# Patient Record
Sex: MALE | Race: ASIAN | NOT HISPANIC OR LATINO | URBAN - METROPOLITAN AREA
[De-identification: names, ages, dates, MRNs, and addresses within clinical notes are randomized per-mention and may not be internally consistent; named-entity substitution may affect disease eponyms.]

---

## 2023-11-22 ENCOUNTER — HOSPITAL ENCOUNTER (EMERGENCY)
Facility: HOSPITAL | Age: 33
Discharge: HOME | End: 2023-11-22
Attending: STUDENT IN AN ORGANIZED HEALTH CARE EDUCATION/TRAINING PROGRAM | Admitting: STUDENT IN AN ORGANIZED HEALTH CARE EDUCATION/TRAINING PROGRAM
Payer: COMMERCIAL

## 2023-11-22 ENCOUNTER — APPOINTMENT (OUTPATIENT)
Dept: GENERAL RADIOLOGY | Facility: HOSPITAL | Age: 33
End: 2023-11-22
Payer: COMMERCIAL

## 2023-11-22 VITALS
HEIGHT: 64 IN | OXYGEN SATURATION: 100 % | TEMPERATURE: 98.1 F | RESPIRATION RATE: 20 BRPM | HEART RATE: 89 BPM | BODY MASS INDEX: 22.53 KG/M2 | WEIGHT: 132 LBS | DIASTOLIC BLOOD PRESSURE: 134 MMHG | SYSTOLIC BLOOD PRESSURE: 188 MMHG

## 2023-11-22 DIAGNOSIS — S61.219A: Primary | ICD-10-CM

## 2023-11-22 DIAGNOSIS — S61.411A: Primary | ICD-10-CM

## 2023-11-22 PROCEDURE — 96372 THER/PROPH/DIAG INJ SC/IM: CPT

## 2023-11-22 PROCEDURE — 73130 X-RAY EXAM OF HAND: CPT

## 2023-11-22 PROCEDURE — 25010000002 TETANUS-DIPHTH-ACELL PERTUSSIS 5-2.5-18.5 LF-MCG/0.5 SUSPENSION PREFILLED SYRINGE: Performed by: NURSE PRACTITIONER

## 2023-11-22 PROCEDURE — 99283 EMERGENCY DEPT VISIT LOW MDM: CPT

## 2023-11-22 PROCEDURE — 25010000002 CEFAZOLIN PER 500 MG: Performed by: NURSE PRACTITIONER

## 2023-11-22 PROCEDURE — 63710000001 ONDANSETRON ODT 4 MG TABLET DISPERSIBLE: Performed by: NURSE PRACTITIONER

## 2023-11-22 PROCEDURE — 90715 TDAP VACCINE 7 YRS/> IM: CPT | Performed by: NURSE PRACTITIONER

## 2023-11-22 PROCEDURE — 90471 IMMUNIZATION ADMIN: CPT | Performed by: NURSE PRACTITIONER

## 2023-11-22 RX ORDER — ONDANSETRON 4 MG/1
4 TABLET, ORALLY DISINTEGRATING ORAL ONCE
Status: COMPLETED | OUTPATIENT
Start: 2023-11-22 | End: 2023-11-22

## 2023-11-22 RX ORDER — ONDANSETRON 4 MG/1
4 TABLET, ORALLY DISINTEGRATING ORAL EVERY 8 HOURS PRN
Qty: 30 TABLET | Refills: 0 | Status: SHIPPED | OUTPATIENT
Start: 2023-11-22

## 2023-11-22 RX ORDER — CEPHALEXIN 500 MG/1
500 CAPSULE ORAL 3 TIMES DAILY
Qty: 30 CAPSULE | Refills: 0 | Status: SHIPPED | OUTPATIENT
Start: 2023-11-22

## 2023-11-22 RX ORDER — CEFAZOLIN SODIUM 1 G/3ML
1 INJECTION, POWDER, FOR SOLUTION INTRAMUSCULAR; INTRAVENOUS ONCE
Status: COMPLETED | OUTPATIENT
Start: 2023-11-22 | End: 2023-11-22

## 2023-11-22 RX ORDER — HYDROCODONE BITARTRATE AND ACETAMINOPHEN 5; 325 MG/1; MG/1
1 TABLET ORAL EVERY 6 HOURS PRN
Qty: 12 TABLET | Refills: 0 | Status: SHIPPED | OUTPATIENT
Start: 2023-11-22

## 2023-11-22 RX ORDER — HYDROCODONE BITARTRATE AND ACETAMINOPHEN 5; 325 MG/1; MG/1
1 TABLET ORAL ONCE AS NEEDED
Status: COMPLETED | OUTPATIENT
Start: 2023-11-22 | End: 2023-11-22

## 2023-11-22 RX ADMIN — HYDROCODONE BITARTRATE AND ACETAMINOPHEN 1 TABLET: 5; 325 TABLET ORAL at 07:03

## 2023-11-22 RX ADMIN — CEFAZOLIN 1 G: 1 INJECTION, POWDER, FOR SOLUTION INTRAMUSCULAR; INTRAVENOUS at 09:31

## 2023-11-22 RX ADMIN — TETANUS TOXOID, REDUCED DIPHTHERIA TOXOID AND ACELLULAR PERTUSSIS VACCINE, ADSORBED 0.5 ML: 5; 2.5; 8; 8; 2.5 SUSPENSION INTRAMUSCULAR at 07:04

## 2023-11-22 RX ADMIN — ONDANSETRON 4 MG: 4 TABLET, ORALLY DISINTEGRATING ORAL at 07:03

## 2023-11-22 NOTE — ED PROVIDER NOTES
EMERGENCY DEPARTMENT ENCOUNTER    Room Number:  02/02  PCP: Provider, No Known  Historian: patient      HPI:  Chief Complaint: Right hand lacerations  A complete HPI/ROS/PMH/PSH/SH/FH are unobtainable due to: Language barrier, patient speaks [place],  # [id number]  Context: Nancy Jefferson is a pleasant afebrile ambulatory 33 y.o. male who presents to the ED c/o injury to his right hand.    Patient primarily speaks [place].  He is right-hand dominant.  States he was using a machine this morning that he seems to describe a jessica when he sustained lacerations to his right first second and third digits.  He denies any numbness or tingling.  He has normal range of motion.  Unsure of his last tetanus immunization            PAST MEDICAL HISTORY  Active Ambulatory Problems     Diagnosis Date Noted    No Active Ambulatory Problems     Resolved Ambulatory Problems     Diagnosis Date Noted    No Resolved Ambulatory Problems     No Additional Past Medical History         PAST SURGICAL HISTORY  No past surgical history on file.      FAMILY HISTORY  No family history on file.      SOCIAL HISTORY  Social History     Socioeconomic History    Marital status: Unknown         ALLERGIES  Patient has no known allergies.        REVIEW OF SYSTEMS  Review of Systems   Skin:  Positive for wound.   All other systems reviewed and are negative.           PHYSICAL EXAM  ED Triage Vitals [11/22/23 0637]   Temp Heart Rate Resp BP SpO2   98.1 °F (36.7 °C) 89 20 (!) 188/134 100 %      Temp src Heart Rate Source Patient Position BP Location FiO2 (%)   Oral Monitor Sitting Left arm --       Physical Exam      GENERAL: Alert and oriented x 4, no acute distress  HENT: nares patent, mucous membranes are moist and intact  EYES: no scleral icterus, no injection  CV: regular rhythm, normal rate, no murmur or peripheral edema  RESPIRATORY: normal effort, clear to auscultation all fields bilaterally  ABDOMEN: soft and nontender diffusely, bowel sounds  WNL  MUSCULOSKELETAL: Laceration to right first second and third digits with palmar laceration flexion present to thumb with moderate soft tissue swelling, cap refill is brisk, normal sensation  NEURO: alert, moves all extremities, follows commands  PSYCH:  calm, cooperative  SKIN: warm, dry    Vital signs and nursing notes reviewed.          LAB RESULTS  No results found for this or any previous visit (from the past 24 hour(s)).    Ordered the above labs and reviewed the results.        RADIOLOGY  XR Hand 3+ View Right    Result Date: 11/22/2023  XR HAND 3+ VW RIGHT-  Clinical: Trauma, laceration 1 through third digits, limited mobility.  FINDINGS: Soft tissue gas along the area of laceration of the thumb, index and long fingers. No acute osseous or articular abnormality of the thumb or long finger. There is several punctate densities demonstrated within the soft tissues adjacent to the proximal phalanx of the index finger. These worrisome for bone fragments. The middle and distal phalanx of the index finger have a satisfactory appearance.  No abnormality of the ring or small fingers nor the metacarpals. The remainder is unremarkable.  This report was finalized on 11/22/2023 7:08 AM by Dr. Axel Trent M.D on Workstation: [employer]       Ordered the above noted radiological studies. Reviewed by me in PACS.            PROCEDURES  Laceration Repair    Date/Time: 11/22/2023 8:00 AM    Performed by: Liz Beauchamp APRN  Authorized by: Stepan Arana MD    Consent:     Consent obtained:  Verbal    Consent given by:  Patient    Risks, benefits, and alternatives were discussed: yes      Risks discussed:  Infection    Alternatives discussed:  No treatment  Universal protocol:     Patient identity confirmed:  Verbally with patient  Anesthesia:     Anesthesia method:  Local infiltration    Local anesthetic:  Lidocaine 1% WITH epi  Laceration details:     Location:  Hand    Hand location:  R palm (r 2nd digit)    Length  (cm):  4  Pre-procedure details:     Preparation:  Patient was prepped and draped in usual sterile fashion and imaging obtained to evaluate for foreign bodies  Exploration:     Hemostasis achieved with:  Epinephrine and direct pressure    Wound exploration: wound explored through full range of motion      Contaminated: yes    Treatment:     Area cleansed with:  Povidone-iodine and saline    Amount of cleaning:  Extensive    Irrigation solution:  Sterile saline    Irrigation method:  Syringe    Visualized foreign bodies/material removed: yes      Debridement:  Minimal  Skin repair:     Repair method:  Sutures    Suture size:  5-0    Suture technique:  Simple interrupted    Number of sutures:  11  Approximation:     Approximation:  Close  Repair type:     Repair type:  Intermediate  Post-procedure details:     Dressing:  Sterile dressing and tube gauze    Procedure completion:  Tolerated well, no immediate complications  Laceration Repair    Date/Time: 11/22/2023 9:51 AM    Performed by: Liz Beauchamp APRN  Authorized by: Stepan Arana MD    Consent:     Consent obtained:  Verbal    Consent given by:  Patient    Risks discussed:  Infection    Alternatives discussed:  No treatment  Universal protocol:     Patient identity confirmed:  Verbally with patient  Anesthesia:     Anesthesia method:  Local infiltration    Local anesthetic:  Lidocaine 1% WITH epi  Laceration details:     Location:  Finger    Finger location:  R long finger    Length (cm):  5  Pre-procedure details:     Preparation:  Patient was prepped and draped in usual sterile fashion and imaging obtained to evaluate for foreign bodies  Exploration:     Limited defect created (wound extended): yes      Hemostasis achieved with:  Direct pressure and epinephrine    Imaging obtained: x-ray      Contaminated: no    Treatment:     Area cleansed with:  Povidone-iodine    Amount of cleaning:  Extensive    Irrigation solution:  Sterile saline    Irrigation  method:  Pressure wash  Skin repair:     Repair method:  Sutures    Suture size:  5-0    Suture material:  Nylon    Suture technique:  Simple interrupted    Number of sutures:  14  Approximation:     Approximation:  Close  Repair type:     Repair type:  Intermediate  Post-procedure details:     Dressing:  Tube gauze    Procedure completion:  Tolerated well, no immediate complications  Laceration Repair    Date/Time: 11/22/2023 10:05 AM    Performed by: Liz Beauchamp APRN  Authorized by: Stepan Arana MD    Consent:     Consent obtained:  Verbal    Consent given by:  Patient    Alternatives discussed:  No treatment  Universal protocol:     Patient identity confirmed:  Verbally with patient  Anesthesia:     Anesthesia method:  Local infiltration    Local anesthetic:  Lidocaine 1% WITH epi  Laceration details:     Location:  Finger    Finger location:  R thumb    Length (cm):  3  Pre-procedure details:     Preparation:  Patient was prepped and draped in usual sterile fashion and imaging obtained to evaluate for foreign bodies  Exploration:     Hemostasis achieved with:  Epinephrine and direct pressure    Wound exploration: wound explored through full range of motion    Treatment:     Area cleansed with:  Povidone-iodine    Amount of cleaning:  Extensive    Irrigation solution:  Sterile saline    Irrigation method:  Pressure wash  Skin repair:     Repair method:  Sutures    Suture technique:  Simple interrupted    Number of sutures:  8  Approximation:     Approximation:  Close  Repair type:     Repair type:  Simple  Post-procedure details:     Dressing:  Tube gauze    Procedure completion:  Tolerated well, no immediate complications  Laceration Repair    Date/Time: 11/22/2023 10:07 AM    Performed by: Liz Beauchamp APRN  Authorized by: Stepan Arana MD    Consent:     Consent obtained:  Verbal    Consent given by:  Patient    Alternatives discussed:  No treatment  Anesthesia:     Anesthesia method:  Local  infiltration    Local anesthetic:  Lidocaine 1% WITH epi  Laceration details:     Location:  Hand    Hand location:  R palm    Length (cm):  2  Pre-procedure details:     Preparation:  Patient was prepped and draped in usual sterile fashion  Exploration:     Limited defect created (wound extended): yes      Hemostasis achieved with:  Epinephrine and direct pressure    Wound exploration: wound explored through full range of motion      Contaminated: no    Treatment:     Area cleansed with:  Povidone-iodine    Amount of cleaning:  Standard    Irrigation solution:  Sterile saline    Irrigation method:  Syringe    Visualized foreign bodies/material removed: no      Debridement:  None    Undermining:  None  Skin repair:     Repair method:  Sutures    Suture size:  5-0    Suture material:  Nylon    Suture technique:  Simple interrupted    Number of sutures:  5  Approximation:     Approximation:  Close  Repair type:     Repair type:  Simple  Post-procedure details:     Dressing:  Tube gauze    Procedure completion:  Tolerated well, no immediate complications              MEDICATIONS GIVEN IN ER  Medications   Tetanus-Diphth-Acell Pertussis (BOOSTRIX) injection 0.5 mL (0.5 mL Intramuscular Given 11/22/23 0704)   HYDROcodone-acetaminophen (NORCO) 5-325 MG per tablet 1 tablet (1 tablet Oral Given 11/22/23 0703)   ondansetron ODT (ZOFRAN-ODT) disintegrating tablet 4 mg (4 mg Oral Given 11/22/23 0703)                   MEDICAL DECISION MAKING, PROGRESS, and CONSULTS    All labs have been independently reviewed by me.  All radiology studies have been reviewed by me and I have also reviewed the radiology report.   EKG's independently viewed and interpreted by me.  Discussion below represents my analysis of pertinent findings related to patient's condition, differential diagnosis, treatment plan and final disposition.      Additional sources:  - Discussed/ obtained information from independent historians:  hx obtained from pt  "using the  ipad    - External (non-ED) record review: 10/17/23 nephrology office visit with [facility] physicians for chronic kidney disease, hypertension and nephrotic syndrome.  Patient has a history of IgA nephropathy  Office note History of Present Illness:   \"32 year old [place] male with Nephrotic Syndrome. 10/15/21 Biopsy showed IgA Nephropathy with Mesangial Proliferation [id number]. Patient was started on Prednisone by Dr. Hutton in November and referred to [facility]. Patient actually started taking Prednisone on January 17, 2022. He tapered off prednisone completely in August 2022. \"    - Chronic or social conditions impacting care:     - Shared decision making:  pt agreeable to bedside mgmt of lacerations      Orders placed during this visit:  Orders Placed This Encounter   Procedures    XR Hand 3+ View Right         Additional orders considered but not ordered:    I considered a CT of patient's hand but I do not think would add much to the clinical picture      Differential diagnosis includes but is not limited to:    Fracture, dislocation, soft tissue injury      Independent interpretation of labs, radiology studies, and discussions with consultants:  ED Course as of 11/22/23 0915   Wed Nov 22, 2023   0756 Phone call with dr. Paulino.  Discussed the patient, relevant history, exam, diagnostics, ED findings/progress, and concerns. They have reviewed images of injury and agrees that the ED can perform primary closure and she will follow patient in the office on monday []      ED Course User Index  [] Liz Beauchamp APRN             I have worn appropriate PPE during this patient encounter, sanitized my hands both with entering and exiting patient's room.      DIAGNOSIS  Final diagnoses:   Laceration of multiple sites of hand and fingers without complication, right, initial encounter         DISPOSITION  home            Latest Documented Vital Signs:  As of 07:22 EST  BP- (!) 188/134 HR- 89 " Temp- 98.1 °F (36.7 °C) (Oral) O2 sat- 100%              --    Please note that portions of this were completed with a voice recognition program.       Note Disclaimer: At [facility], we believe that sharing information builds trust and better relationships. You are receiving this note because you are receiving care at [facility] or recently visited. It is possible you will see health information before a provider has talked with you about it. This kind of information can be easy to misunderstand. To help you fully understand what it means for your health, we urge you to discuss this note with your provider.             Liz Beauchamp, APRN  11/22/23 7768

## 2023-11-22 NOTE — ED PROVIDER NOTES
MD ATTESTATION NOTE    The JENNIFER and I have discussed this patient's history, physical exam, and treatment plan.  I have reviewed the documentation and personally had a face to face interaction with the patient. I affirm the documentation and agree with the treatment and plan.  The attached note describes my personal findings.      I provided a substantive portion of the care of the patient.  I personally performed the physical exam in its entirety, and below are my findings.        Brief HPI: 33-year-old male presenting for evaluation of multiple hand lacerations.  Patient was at work when a he got his hand caught in a machine and sustained several lacerations to his right hand.    PHYSICAL EXAM  ED Triage Vitals [11/22/23 0637]   Temp Heart Rate Resp BP SpO2   98.1 °F (36.7 °C) 89 20 (!) 188/134 100 %      Temp src Heart Rate Source Patient Position BP Location FiO2 (%)   Oral Monitor Sitting Left arm --         GENERAL: no acute distress  HENT: nares patent  EYES: no scleral icterus  CV: regular rhythm, normal rate  RESPIRATORY: normal effort  ABDOMEN: soft  MUSCULOSKELETAL: no deformity.  Lacerations to the right first/second/third digits with a large palmar laceration across the thumb.  Appropriate flexion and extension present in all fingers  NEURO: alert, moves all extremities, follows commands  PSYCH:  calm, cooperative  SKIN: warm, dry    Vital signs and nursing notes reviewed.        Plan: 33-year-old male presenting for evaluation of laceration.  Patient noted to have extensive lacerations across the right palm and multiple fingers.  Lacerations repaired by AIDE Perdomo.  X-rays demonstrate findings worrisome for bone fragments.  Case discussed with Dr. Paulino of hand surgery who agrees to follow-up with patient in clinic next week.  Patient be started on antibiotics and discharged to follow-up as recommended by specialist       Stepan Arana MD  11/22/23 3504

## 2023-11-22 NOTE — Clinical Note
[facility] EMERGENCY DEPARTMENT  [address] [facility] [address]  Phone: [phone]    Nancy Jefferson was seen and treated in our emergency department on 11/22/2023.  He may return to work on 11/28/2023.         Thank you for choosing [facility].    Liz Beauchamp APRN